# Patient Record
Sex: MALE | Race: WHITE | NOT HISPANIC OR LATINO | Employment: UNEMPLOYED | URBAN - METROPOLITAN AREA
[De-identification: names, ages, dates, MRNs, and addresses within clinical notes are randomized per-mention and may not be internally consistent; named-entity substitution may affect disease eponyms.]

---

## 2022-06-10 ENCOUNTER — LAB REQUISITION (OUTPATIENT)
Dept: LAB | Facility: CLINIC | Age: 67
End: 2022-06-10
Payer: COMMERCIAL

## 2022-06-10 ENCOUNTER — TRANSFERRED RECORDS (OUTPATIENT)
Dept: HEALTH INFORMATION MANAGEMENT | Facility: CLINIC | Age: 67
End: 2022-06-10

## 2022-06-10 DIAGNOSIS — E29.1 TESTICULAR HYPOFUNCTION: ICD-10-CM

## 2022-06-10 LAB
CHOLEST SERPL-MCNC: 220 MG/DL
HDLC SERPL-MCNC: 58 MG/DL
LDLC SERPL CALC-MCNC: 142 MG/DL
PROLACTIN SERPL-MCNC: 85.1 NG/ML (ref 0–15)
TRIGL SERPL-MCNC: 98 MG/DL

## 2022-06-10 PROCEDURE — 84146 ASSAY OF PROLACTIN: CPT | Mod: ORL | Performed by: FAMILY MEDICINE

## 2022-06-10 PROCEDURE — 80061 LIPID PANEL: CPT | Mod: ORL | Performed by: FAMILY MEDICINE

## 2022-06-27 ENCOUNTER — MEDICAL CORRESPONDENCE (OUTPATIENT)
Dept: HEALTH INFORMATION MANAGEMENT | Facility: CLINIC | Age: 67
End: 2022-06-27

## 2022-07-06 ENCOUNTER — TRANSCRIBE ORDERS (OUTPATIENT)
Dept: OTHER | Age: 67
End: 2022-07-06

## 2022-07-06 DIAGNOSIS — D49.7 PITUITARY TUMOR: ICD-10-CM

## 2022-07-06 DIAGNOSIS — R79.89 ELEVATED PROLACTIN LEVEL: Primary | ICD-10-CM

## 2022-07-23 ENCOUNTER — HEALTH MAINTENANCE LETTER (OUTPATIENT)
Age: 67
End: 2022-07-23

## 2022-09-25 ASSESSMENT — ENCOUNTER SYMPTOMS
JAUNDICE: 0
BOWEL INCONTINENCE: 0
RECTAL PAIN: 0
BLOOD IN STOOL: 0
NAUSEA: 0
DYSURIA: 0
DIFFICULTY URINATING: 0
DIARRHEA: 1
FLANK PAIN: 0
HEARTBURN: 0
ABDOMINAL PAIN: 0
CONSTIPATION: 1
BLOATING: 0
HEMATURIA: 0
VOMITING: 0

## 2022-09-26 NOTE — PROGRESS NOTES
Reached out via phone to the pt on 9/26 to remind them of their visit with Dr. Mosqueda.  Irina Lawton                                                                               - Endocrinology Initial Consultation -    Reason for visit/consult:     Elevated prolactin level  Pituitary tumor    Primary care provider: Ashvin Bullock    HPI: A 68 yo male here for the care transition and evaluation of prolactinoma and hypogonadism.  He used to be seen by endocrinology provider at [facility].  Patient was diagnosed hypogonadism in 2001 at that time his testosterone level was 242 and as a part of the work-up found out prolactin was mildly elevated to 48.  He was a started on cabergoline 1 tablet 0.25 mg twice a week he recalls around 2010 he was started and currently still taking with good compliance.  For the testosterone he used to use injection therapy but he did not like the injection and he could not tolerate and he switched to a compound pharmacy of the testosterone product and the testosterone level has been trending.  Moderate fluctuation range between 179-681.  He also has BPH.  He had a 3 times MRI was done 2001 initial MRI undetectable with addition in 2014 3 mm with a hypodense onset area in the sella and, repeated 1 in March 2020 has no change.     Past Medical/Surgical History:  No past medical history on file.  Past Surgical History:   Procedure Laterality Date     INSERT INTRACORONARY STENT      Multiple        Allergies:  Allergies   Allergen Reactions     Gluten Protein [Gluten Meal] Unknown     Hands peel       Current Medications   Current Outpatient Medications   Medication     cabergoline (DOSTINEX) 0.5 MG tablet     Cyanocobalamin (B-12) 1000 MCG TBCR     cyanocobalamin (VITAMIN B-12) 1000 MCG tablet     fish oil-omega-3 fatty acids 500 MG capsule     Glucos-MSM-C-Us-Pccjtd-Xqcnzm (GLUCOSAMINE MSM COMPLEX) TABS tablet     Glucosamine-Chondroitin-MSM (CONDROLITE) 500-200-150 MG TABS     Multiple  Vitamin (MULTIVITAMIN ADULT PO)     selenium 50 MCG TABS tablet     UNABLE TO FIND     UNABLE TO FIND     venlafaxine (EFFEXOR XR) 37.5 MG 24 hr capsule     vitamin C (ASCORBIC ACID) 1000 MG TABS     vitamin D2 (ERGOCALCIFEROL) 71295 units (1250 mcg) capsule     BROMOCRIPTINE MESYLATE PO     No current facility-administered medications for this visit.       Family History:  Family History   Problem Relation Age of Onset     Breast Cancer Mother      Prostate Cancer Father      Breast Cancer Sister        Social History:  Social History     Tobacco Use     Smoking status: Never Smoker     Smokeless tobacco: Never Used   Substance Use Topics     Alcohol use: Yes       ROS:  Full review of systems taken with the help of the intake sheet. Otherwise a complete 14 point review of systems was taken and is negative unless stated in the history above.    Physical Exam:   Vitals: /70 (BP Location: Left arm, Patient Position: Sitting, Cuff Size: Adult Regular)   Pulse 89   Wt 93.9 kg (207 lb)   BMI 29.70 kg/m    BMI= Body mass index is 29.7 kg/m .   General: well appearing, no acute distress, pleasant and conversant,   Mental Status/neuro: alert and oriented  Face: symmetrical, normal facial color  Eyes: anicteric, no proptosis or lid lag  Resp: breathing normally      Labs : I reviewed data from epic and extract and summarize the pertinent data here.      Latest Reference Range & Units 06/10/22 14:54   Prolactin 0.0 - 15.0 ng/mL 85.1 (H)       Lab Results   Component Value Date     09/30/2013      Lab Results   Component Value Date    POTASSIUM 4.3 09/30/2013     Lab Results   Component Value Date    CHLORIDE 101 09/30/2013     Lab Results   Component Value Date    ABEL 8.4 09/30/2013     Lab Results   Component Value Date    CO2 29 09/30/2013     Lab Results   Component Value Date    BUN 15 09/30/2013     Lab Results   Component Value Date    CR 1.05 09/30/2013     Lab Results   Component Value Date      09/30/2013     No results found for: TSH  No results found for: T4  No results found for: A1C    No results found for: IGF1  No results found for: LH  No results found for: FSH  No results found for: ESTROGEN  No results found for: PROLACTIN        MRI Brain: I personally reviewed the original images and agree with the below reports.   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  So far he has been tolerating with this medication and level has been slight supranormal but stable we will continue for the current date medication dose and no change.    -Continue cabergoline 0.5 mg twice a week for now  -Repeating prolactin level in 6 months and in 1 year    Hypogonadism  Currently he is getting testosterone compound from compound pharmacy and he is hoping to continue to get compound pharmacy medication and he is integrated medicine physician will assist for compound medication    -Continue current compounded testosterone product by integrated medicine doctor    -Total testosterone level in 6 months and in 1 year      RTC with me in 1 year    Total 60  minutes spent on the date of the encounter doing chart review, history and exam, reviewing other site medical records, documentation and further activities as noted above.        Cindy Mosqueda MD  Staff Physician  Endocrinology and Metabolism  License: [id number]

## 2022-09-28 ENCOUNTER — OFFICE VISIT (OUTPATIENT)
Dept: ENDOCRINOLOGY | Facility: CLINIC | Age: 67
End: 2022-09-28
Attending: FAMILY MEDICINE
Payer: COMMERCIAL

## 2022-09-28 VITALS
BODY MASS INDEX: 29.7 KG/M2 | WEIGHT: 207 LBS | SYSTOLIC BLOOD PRESSURE: 121 MMHG | DIASTOLIC BLOOD PRESSURE: 70 MMHG | HEART RATE: 89 BPM

## 2022-09-28 DIAGNOSIS — D49.7 PITUITARY TUMOR: ICD-10-CM

## 2022-09-28 DIAGNOSIS — E29.1 HYPOGONADISM MALE: ICD-10-CM

## 2022-09-28 DIAGNOSIS — D35.2 PROLACTINOMA (H): Primary | ICD-10-CM

## 2022-09-28 PROCEDURE — 99205 OFFICE O/P NEW HI 60 MIN: CPT | Performed by: INTERNAL MEDICINE

## 2022-09-28 RX ORDER — ERGOCALCIFEROL 1.25 MG/1
50000 CAPSULE, LIQUID FILLED ORAL WEEKLY
COMMUNITY

## 2022-09-28 RX ORDER — CABERGOLINE 0.5 MG/1
0.05 TABLET ORAL CONTINUOUS
COMMUNITY
Start: 2022-06-29 | End: 2024-06-26

## 2022-09-28 RX ORDER — LANOLIN ALCOHOL/MO/W.PET/CERES
1000 CREAM (GRAM) TOPICAL DAILY
COMMUNITY

## 2022-09-28 RX ORDER — OMEGA-3/DHA/EPA/FISH OIL 60 MG-90MG
CAPSULE ORAL
COMMUNITY

## 2022-09-28 RX ORDER — VENLAFAXINE HYDROCHLORIDE 37.5 MG/1
37.5 CAPSULE, EXTENDED RELEASE ORAL 2 TIMES DAILY
COMMUNITY
Start: 2022-09-02

## 2022-09-28 RX ORDER — MULTIVIT WITH MINERALS/LUTEIN
1000 TABLET ORAL DAILY
COMMUNITY

## 2022-09-28 RX ORDER — GLUCOSAMINE/CHONDROITIN A/MSM 500-200 MG
TABLET ORAL
COMMUNITY

## 2022-09-28 ASSESSMENT — PAIN SCALES - GENERAL: PAINLEVEL: NO PAIN (0)

## 2022-09-28 NOTE — NURSING NOTE
"Chief Complaint   Patient presents with     New Patient     Vital signs:      BP: 121/70 Pulse: 89             Weight: 93.9 kg (207 lb)  Estimated body mass index is 29.7 kg/m  as calculated from the following:    Height as of 8/20/14: 1.778 m (5' 10\").    Weight as of this encounter: 93.9 kg (207 lb).          "

## 2022-10-01 ENCOUNTER — HEALTH MAINTENANCE LETTER (OUTPATIENT)
Age: 67
End: 2022-10-01

## 2023-08-06 ENCOUNTER — HEALTH MAINTENANCE LETTER (OUTPATIENT)
Age: 68
End: 2023-08-06

## 2024-06-26 ENCOUNTER — OFFICE VISIT (OUTPATIENT)
Dept: ENDOCRINOLOGY | Facility: CLINIC | Age: 69
End: 2024-06-26
Payer: COMMERCIAL

## 2024-06-26 VITALS
OXYGEN SATURATION: 96 % | DIASTOLIC BLOOD PRESSURE: 89 MMHG | SYSTOLIC BLOOD PRESSURE: 133 MMHG | HEART RATE: 72 BPM | WEIGHT: 215 LBS | BODY MASS INDEX: 30.85 KG/M2

## 2024-06-26 DIAGNOSIS — E29.1 HYPOGONADISM MALE: ICD-10-CM

## 2024-06-26 DIAGNOSIS — D35.2 PROLACTINOMA (H): Primary | ICD-10-CM

## 2024-06-26 PROCEDURE — 99214 OFFICE O/P EST MOD 30 MIN: CPT | Performed by: INTERNAL MEDICINE

## 2024-06-26 PROCEDURE — G2211 COMPLEX E/M VISIT ADD ON: HCPCS | Performed by: INTERNAL MEDICINE

## 2024-06-26 RX ORDER — CABERGOLINE 0.5 MG/1
0.05 TABLET ORAL DAILY
Status: CANCELLED | OUTPATIENT
Start: 2024-06-26

## 2024-06-26 RX ORDER — CABERGOLINE 0.5 MG/1
0.5 TABLET ORAL
Qty: 24 TABLET | Refills: 3 | Status: SHIPPED | OUTPATIENT
Start: 2024-06-27 | End: 2024-06-28

## 2024-06-26 RX ORDER — LEVOTHYROXINE SODIUM 75 UG/1
75 TABLET ORAL DAILY
Qty: 90 TABLET | Refills: 3 | Status: SHIPPED | OUTPATIENT
Start: 2024-06-26

## 2024-06-26 RX ORDER — TESTOSTERONE 1.62 MG/G
1 GEL TRANSDERMAL DAILY
Qty: 75 G | Refills: 3 | Status: SHIPPED | OUTPATIENT
Start: 2024-06-26

## 2024-06-26 ASSESSMENT — PAIN SCALES - GENERAL: PAINLEVEL: NO PAIN (0)

## 2024-06-26 NOTE — LETTER
6/26/2024       RE: Dylan Bob  [address] [facility] [address]     Dear Colleague,    Thank you for referring your patient, Dylan Bob, to the [facility]. Please see a copy of my visit note below.                                                                               - Endocrinology Follow up -    Reason for visit/consult:     Elevated prolactin level  Pituitary tumor    Primary care provider: Ashvin Bullock    Assessment and Plan  A 69 year old male with microprolactinoma and mild hypogonadism    Micro-prolactinoma  Lesion has been stable 3 mm, prolactin level has been mildly elevated most recent 1 in June 2022 showed 85.1 with cabergoline 0.5 mg twice a week over 10 years, most recent lab in 2/2024 at Dr. Johnson's office was 143, increased to 3 tab weekly, however patient is not prefer 3 tab a week and reviewing lab, this is relatively stable condition, therefore I will put back to his regular dose 2 tab weekly    -cabergoline 0.5 mg twice a week   -Repeating prolactin level in 6 months and in 1 year    Hypogonadism  Total tetosterone level has been low, he used to use gel 10 years ago    - start testosterone gel 1 pump daily    -Total testosterone level in 6 months and in 1 year    Elevated TSH   Mild hypothyroidism and worsening fatigue    - start LT4 75 mcg daily      RTC with me in 1 year    The longitudinal plan of care for the diagnosis(es)/condition(s) as documented were addressed during this visit. Due to the added complexity in care, I will continue to support Marvin in the subsequent management and with ongoing continuity of care.     Total 35 minutes spent on the date of the encounter doing chart review, history and exam, reviewing other site medical records, documentation and further activities as noted above.      Cindy Mosqueda MD  Staff Physician  Endocrinology and  Metabolism  License: [id number]      Interval History as of 6/26/2024 : Patient has been doing well.  Medication compliance  excellent . New event includes : recently feeling more fatigued and less active in his life.   HPI: A 66 yo male here for the care transition and evaluation of prolactinoma and hypogonadism.  He used to be seen by endocrinology provider at [facility].  Patient was diagnosed hypogonadism in 2001 at that time his testosterone level was 242 and as a part of the work-up found out prolactin was mildly elevated to 48.  He was a started on cabergoline 1 tablet 0.25 mg twice a week he recalls around 2010 he was started and currently still taking with good compliance.  For the testosterone he used to use injection therapy but he did not like the injection and he could not tolerate and he switched to a compound pharmacy of the testosterone product and the testosterone level has been trending.  Moderate fluctuation range between 179-681.  He also has BPH.  He had a 3 times MRI was done 2001 initial MRI undetectable with addition in 2014 3 mm with a hypodense onset area in the sella and, repeated 1 in March 2020 has no change.     Past Medical/Surgical History:  No past medical history on file.  Past Surgical History:   Procedure Laterality Date    INSERT INTRACORONARY STENT      Multiple        Allergies:  Allergies   Allergen Reactions    Gluten Protein [Gluten Meal] Unknown     Hands peel       Current Medications   Current Outpatient Medications   Medication Sig Dispense Refill    cabergoline (DOSTINEX) 0.5 MG tablet Take 0.05 mg by mouth continuously Takes 3 each week = 1.5MG      Cyanocobalamin (B-12) 1000 MCG TBCR       cyanocobalamin (VITAMIN B-12) 1000 MCG tablet Take 1,000 mcg by mouth daily      fish oil-omega-3 fatty acids 500 MG capsule       Glucos-MSM-C-Ts-Rwknqy-Gspeyp (GLUCOSAMINE MSM COMPLEX) TABS tablet Take 1 tablet by mouth daily 500mg      Glucosamine-Chondroitin-MSM (CONDROLITE)  500-200-150 MG TABS       Multiple Vitamin (MULTIVITAMIN ADULT PO)       selenium 50 MCG TABS tablet Take 200 mcg by mouth daily      UNABLE TO FIND 320 mg daily MEDICATION NAME: ProstAvan (Oligo)      UNABLE TO FIND daily MEDICATION NAME: DopaPuls (pure Encapsulations)      venlafaxine (EFFEXOR XR) 37.5 MG 24 hr capsule Take 37.5 mg by mouth 2 times daily      vitamin C (ASCORBIC ACID) 1000 MG TABS Take 1,000 mg by mouth daily      vitamin D2 (ERGOCALCIFEROL) 46191 units (1250 mcg) capsule Take 50,000 Units by mouth once a week      BROMOCRIPTINE MESYLATE PO        No current facility-administered medications for this visit.       Family History:  Family History   Problem Relation Age of Onset    Breast Cancer Mother     Prostate Cancer Father     Breast Cancer Sister        Social History:  Social History     Tobacco Use    Smoking status: Never    Smokeless tobacco: Never   Substance Use Topics    Alcohol use: Yes       ROS:  Full review of systems taken with the help of the intake sheet. Otherwise a complete 14 point review of systems was taken and is negative unless stated in the history above.    Physical Exam:   Vitals: /89   Pulse 72   Wt 97.5 kg (215 lb)   SpO2 96%   BMI 30.85 kg/m    BMI= Body mass index is 30.85 kg/m .   General: well appearing, no acute distress, pleasant and conversant,   Mental Status/neuro: alert and oriented  Face: symmetrical, normal facial color  Eyes: anicteric, no proptosis or lid lag  Resp: breathing normally      Labs : I reviewed data from epic and extract and summarize the pertinent data here.          Latest Reference Range & Units 06/10/22 14:54   Prolactin 0.0 - 15.0 ng/mL 85.1 (H)         MRI Brain: I personally reviewed the original images and agree with the below reports.   3 mm hypoenhanced sellar lesion

## 2024-06-26 NOTE — PROGRESS NOTES
- Endocrinology Follow up -    Reason for visit/consult:     Elevated prolactin level  Pituitary tumor    Primary care provider: Ashvin Bullock    Assessment and Plan  A 69 year old male with microprolactinoma and mild hypogonadism    Micro-prolactinoma  Lesion has been stable 3 mm, prolactin level has been mildly elevated most recent 1 in June 2022 showed 85.1 with cabergoline 0.5 mg twice a week over 10 years, most recent lab in 2/2024 at Dr. Johnson's office was 143, increased to 3 tab weekly, however patient is not prefer 3 tab a week and reviewing lab, this is relatively stable condition, therefore I will put back to his regular dose 2 tab weekly    -cabergoline 0.5 mg twice a week   -Repeating prolactin level in 6 months and in 1 year    Hypogonadism  Total tetosterone level has been low, he used to use gel 10 years ago    - start testosterone gel 1 pump daily    -Total testosterone level in 6 months and in 1 year    Elevated TSH   Mild hypothyroidism and worsening fatigue    - start LT4 75 mcg daily      RTC with me in 1 year    The longitudinal plan of care for the diagnosis(es)/condition(s) as documented were addressed during this visit. Due to the added complexity in care, I will continue to support Marvin in the subsequent management and with ongoing continuity of care.     Total 35 minutes spent on the date of the encounter doing chart review, history and exam, reviewing other site medical records, documentation and further activities as noted above.      Cindy Mosqueda MD  Staff Physician  Endocrinology and Metabolism  License: [id number]      Interval History as of 6/26/2024 : Patient has been doing well.  Medication compliance  excellent . New event includes : recently feeling more fatigued and less active in his life.   HPI: A 66 yo male here for the care transition and evaluation of prolactinoma and hypogonadism.  He used to  be seen by endocrinology provider at [facility].  Patient was diagnosed hypogonadism in 2001 at that time his testosterone level was 242 and as a part of the work-up found out prolactin was mildly elevated to 48.  He was a started on cabergoline 1 tablet 0.25 mg twice a week he recalls around 2010 he was started and currently still taking with good compliance.  For the testosterone he used to use injection therapy but he did not like the injection and he could not tolerate and he switched to a compound pharmacy of the testosterone product and the testosterone level has been trending.  Moderate fluctuation range between 179-681.  He also has BPH.  He had a 3 times MRI was done 2001 initial MRI undetectable with addition in 2014 3 mm with a hypodense onset area in the sella and, repeated 1 in March 2020 has no change.     Past Medical/Surgical History:  No past medical history on file.  Past Surgical History:   Procedure Laterality Date    INSERT INTRACORONARY STENT      Multiple        Allergies:  Allergies   Allergen Reactions    Gluten Protein [Gluten Meal] Unknown     Hands peel       Current Medications   Current Outpatient Medications   Medication Sig Dispense Refill    cabergoline (DOSTINEX) 0.5 MG tablet Take 0.05 mg by mouth continuously Takes 3 each week = 1.5MG      Cyanocobalamin (B-12) 1000 MCG TBCR       cyanocobalamin (VITAMIN B-12) 1000 MCG tablet Take 1,000 mcg by mouth daily      fish oil-omega-3 fatty acids 500 MG capsule       Glucos-MSM-C-Va-Gfdyeo-Owfprb (GLUCOSAMINE MSM COMPLEX) TABS tablet Take 1 tablet by mouth daily 500mg      Glucosamine-Chondroitin-MSM (CONDROLITE) 500-200-150 MG TABS       Multiple Vitamin (MULTIVITAMIN ADULT PO)       selenium 50 MCG TABS tablet Take 200 mcg by mouth daily      UNABLE TO FIND 320 mg daily MEDICATION NAME: ProstAvan (Oligo)      UNABLE TO FIND daily MEDICATION NAME: DopaPuls (pure Encapsulations)      venlafaxine (EFFEXOR XR) 37.5 MG 24 hr capsule Take  37.5 mg by mouth 2 times daily      vitamin C (ASCORBIC ACID) 1000 MG TABS Take 1,000 mg by mouth daily      vitamin D2 (ERGOCALCIFEROL) 15642 units (1250 mcg) capsule Take 50,000 Units by mouth once a week      BROMOCRIPTINE MESYLATE PO        No current facility-administered medications for this visit.       Family History:  Family History   Problem Relation Age of Onset    Breast Cancer Mother     Prostate Cancer Father     Breast Cancer Sister        Social History:  Social History     Tobacco Use    Smoking status: Never    Smokeless tobacco: Never   Substance Use Topics    Alcohol use: Yes       ROS:  Full review of systems taken with the help of the intake sheet. Otherwise a complete 14 point review of systems was taken and is negative unless stated in the history above.    Physical Exam:   Vitals: /89   Pulse 72   Wt 97.5 kg (215 lb)   SpO2 96%   BMI 30.85 kg/m    BMI= Body mass index is 30.85 kg/m .   General: well appearing, no acute distress, pleasant and conversant,   Mental Status/neuro: alert and oriented  Face: symmetrical, normal facial color  Eyes: anicteric, no proptosis or lid lag  Resp: breathing normally      Labs : I reviewed data from epic and extract and summarize the pertinent data here.          Latest Reference Range & Units 06/10/22 14:54   Prolactin 0.0 - 15.0 ng/mL 85.1 (H)         MRI Brain: I personally reviewed the original images and agree with the below reports.   3 mm hypoenhanced sellar lesion

## 2024-06-26 NOTE — NURSING NOTE
"Chief Complaint   Patient presents with    Pituitary Problem     Vital signs:      BP: 133/89 Pulse: 72     SpO2: 96 %       Weight: 97.5 kg (215 lb)  Estimated body mass index is 30.85 kg/m  as calculated from the following:    Height as of 8/20/14: 1.778 m (5' 10\").    Weight as of this encounter: 97.5 kg (215 lb).        "

## 2024-06-28 ENCOUNTER — TELEPHONE (OUTPATIENT)
Dept: ENDOCRINOLOGY | Facility: CLINIC | Age: 69
End: 2024-06-28
Payer: COMMERCIAL

## 2024-06-28 DIAGNOSIS — D35.2 PROLACTINOMA (H): ICD-10-CM

## 2024-06-28 RX ORDER — CABERGOLINE 0.5 MG/1
TABLET ORAL
Qty: 24 TABLET | Refills: 3 | Status: SHIPPED | OUTPATIENT
Start: 2024-07-01

## 2024-06-28 NOTE — TELEPHONE ENCOUNTER
Sent new orders  dc'ing the old order for 1 twice weekly as current order Tricia Mann RN on 6/28/2024 at 6:21 PM

## 2024-06-28 NOTE — TELEPHONE ENCOUNTER
[facility] Call Center    Phone Message    May a detailed message be left on voicemail: yes     Reason for Call: Medication Question or concern regarding medication   Prescription Clarification  Name of Medication: cabergoline (DOSTINEX) 0.5 MG tablet [9616  Prescribing Provider: Cindy Mosqueda MD   Pharmacy: [id number] [address]   What on the order needs clarification? Pharmacy is requesting a clarification of instructions      Action Taken: Other: endo    Travel Screening: Not Applicable     Date of Service:

## 2024-07-22 ENCOUNTER — TELEPHONE (OUTPATIENT)
Dept: ENDOCRINOLOGY | Facility: CLINIC | Age: 69
End: 2024-07-22
Payer: COMMERCIAL

## 2024-07-22 NOTE — TELEPHONE ENCOUNTER
Patient confirmed scheduled appointment:  Date: 6/25/25   Time: 10 am   Visit type: return pituitary   Provider: Jaylin   Location[place] [facility]  Testing/imaging: NA   Additional notes: Spoke to pt and scheduled per below message. Also canceled appt in September due to only needing a 1 year follow-up     Disposition: Return in about 1 year (around 6/26/2025).     Milvia Cisneros on 7/22/2024 at 3:02 PM

## 2024-09-29 ENCOUNTER — HEALTH MAINTENANCE LETTER (OUTPATIENT)
Age: 69
End: 2024-09-29

## 2025-04-16 ENCOUNTER — LAB (OUTPATIENT)
Dept: LAB | Facility: CLINIC | Age: 70
End: 2025-04-16
Payer: COMMERCIAL

## 2025-04-16 DIAGNOSIS — D35.2 PROLACTINOMA (H): ICD-10-CM

## 2025-04-16 DIAGNOSIS — E29.1 HYPOGONADISM MALE: ICD-10-CM

## 2025-04-16 LAB
PROLACTIN SERPL 3RD IS-MCNC: 134 NG/ML (ref 4–15)
T4 FREE SERPL-MCNC: 1.47 NG/DL (ref 0.9–1.7)
TSH SERPL DL<=0.005 MIU/L-ACNC: 0.06 UIU/ML (ref 0.3–4.2)

## 2025-04-16 PROCEDURE — 84146 ASSAY OF PROLACTIN: CPT

## 2025-04-16 PROCEDURE — 84439 ASSAY OF FREE THYROXINE: CPT

## 2025-04-16 PROCEDURE — 36415 COLL VENOUS BLD VENIPUNCTURE: CPT

## 2025-04-16 PROCEDURE — 84443 ASSAY THYROID STIM HORMONE: CPT

## 2025-04-19 LAB — TESTOST SERPL-MCNC: 124 NG/DL (ref 240–950)

## 2025-05-18 ENCOUNTER — MYC REFILL (OUTPATIENT)
Dept: ENDOCRINOLOGY | Facility: CLINIC | Age: 70
End: 2025-05-18
Payer: COMMERCIAL

## 2025-05-18 DIAGNOSIS — D35.2 PROLACTINOMA (H): ICD-10-CM

## 2025-05-19 RX ORDER — CABERGOLINE 0.5 MG/1
TABLET ORAL
Qty: 24 TABLET | Refills: 0 | Status: SHIPPED | OUTPATIENT
Start: 2025-05-19

## 2025-06-25 ENCOUNTER — OFFICE VISIT (OUTPATIENT)
Dept: ENDOCRINOLOGY | Facility: CLINIC | Age: 70
End: 2025-06-25
Payer: COMMERCIAL

## 2025-06-25 VITALS
OXYGEN SATURATION: 96 % | SYSTOLIC BLOOD PRESSURE: 123 MMHG | WEIGHT: 210 LBS | HEART RATE: 70 BPM | DIASTOLIC BLOOD PRESSURE: 82 MMHG

## 2025-06-25 DIAGNOSIS — D35.2 PROLACTINOMA (H): ICD-10-CM

## 2025-06-25 DIAGNOSIS — E29.1 HYPOGONADISM MALE: ICD-10-CM

## 2025-06-25 PROCEDURE — 3074F SYST BP LT 130 MM HG: CPT | Performed by: INTERNAL MEDICINE

## 2025-06-25 PROCEDURE — 99214 OFFICE O/P EST MOD 30 MIN: CPT | Performed by: INTERNAL MEDICINE

## 2025-06-25 PROCEDURE — G2211 COMPLEX E/M VISIT ADD ON: HCPCS | Performed by: INTERNAL MEDICINE

## 2025-06-25 PROCEDURE — 1126F AMNT PAIN NOTED NONE PRSNT: CPT | Performed by: INTERNAL MEDICINE

## 2025-06-25 PROCEDURE — 3079F DIAST BP 80-89 MM HG: CPT | Performed by: INTERNAL MEDICINE

## 2025-06-25 RX ORDER — LEVOTHYROXINE SODIUM 75 UG/1
75 TABLET ORAL DAILY
Qty: 90 TABLET | Refills: 3 | Status: SHIPPED | OUTPATIENT
Start: 2025-06-25

## 2025-06-25 RX ORDER — CABERGOLINE 0.5 MG/1
TABLET ORAL
Qty: 24 TABLET | Refills: 3 | Status: SHIPPED | OUTPATIENT
Start: 2025-06-25

## 2025-06-25 ASSESSMENT — PAIN SCALES - GENERAL: PAINLEVEL_OUTOF10: NO PAIN (0)

## 2025-06-25 NOTE — NURSING NOTE
"Chief Complaint   Patient presents with    Pituitary Problem    Thyroid Problem      PATIENT HEALTH QUESTIONNAIRE-9 (PHQ - 9)    Over the last 2 weeks, how often have you been bothered by any of the following problems?    1. Little interest or pleasure in doing things -  Several days   2. Feeling down, depressed, or hopeless -  Several days   3. Trouble falling or staying asleep, or sleeping too much - Not at all   4. Feeling tired or having little energy -  Not at all   5. Poor appetite or overeating -  Not at all   6. Feeling bad about yourself - or that you are a failure or have let yourself or your family down -      7. Trouble concentrating on things, such as reading the newspaper or watching television - Not at all   8. Moving or speaking so slowly that other people could have noticed? Or the opposite - being so fidgety or restless that you have been moving around a lot more than usual Not at all   9. Thoughts that you would be better off dead or of hurting  yourself in some way Not at all   Total Score:       If you checked off any problems, how difficult have these problems made it for you to do your work, take care of things at home, or get along with other people?      Developed by Oswaldo Castillo, Mariann Stern, Ariel Chen and colleagues, with an educational greer from [employer] No permission required to reproduce, translate, display or distribute. permission required to reproduce, translate, display or distribute.    Vital signs:      BP: 123/82 Pulse: 70     SpO2: 96 %       Weight: 95.3 kg (210 lb)  Estimated body mass index is 30.85 kg/m  as calculated from the following:    Height as of 8/20/14: 1.778 m (5' 10\").    Weight as of 6/26/24: 97.5 kg (215 lb).        "

## 2025-06-25 NOTE — PROGRESS NOTES
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 Johnson's office was 143, increased to 3 tab weekly, however patient did not prefer 3 tab a week and reviewing lab, this is relatively stable condition, therefore we put him back on his regular dose 2 tab weekly. His most recent PRL 4/16/25 was stable at 134. We discussed that because his condition is stable on 2 tab weekly and he had side effects on 3 tablets we will continue this dose.    -continue cabergoline 0.5 mg twice a week   -Repeating prolactin level in 6 months and in 1 year    Hypogonadism  Total tetosterone level has been low most recent 4/16/25 was 124. He used to use gel 10 years ago. He has also tried injections and an aromatase inhibitor in the past. He had side effects every time he went on testosterone. He experiences agitation and reports he is argumentative with his wife. We discussed the importance of trying to get his levels up.    - start testosterone gel 1 pump daily  -Total testosterone level in 6 months and in 1 year    Hypothyroidism  - continue LT4 75 mcg daily    RTC with me in 1 year    Patient was seen and plan of care discussed with Dr. Mosqueda.      Jessie Lainez, MS3      Attending tie-in note   I saw the patient with Jessie Lainez MS3  and directly examined patient and discussed. Agree above note and plan.    The longitudinal plan of care for the diagnosis(es)/condition(s) as documented were addressed during this visit. Due to the added complexity in care, I will  continue to support Marvin in the subsequent management and with ongoing continuity of care.     Total 35 minutes spent on the date of the encounter doing chart review, history and exam, reviewing other site medical records, documentation and further activities as noted above.      Cindy Mosqueda MD  Staff Physician  Endocrinology and Metabolism  License: [id number]      Interval History as of 6/25/2025 : Patient has been doing well. Compliance to cabergoline and LT4 excellent.   Interval History as of 6/26/2024 : Patient has been doing well.  Medication compliance  excellent . New event includes : recently feeling more fatigued and less active in his life.   HPI: A 68 yo male here for the care transition and evaluation of prolactinoma and hypogonadism.  He used to be seen by endocrinology provider at [facility].  Patient was diagnosed hypogonadism in 2001 at that time his testosterone level was 242 and as a part of the work-up found out prolactin was mildly elevated to 48.  He was a started on cabergoline 1 tablet 0.25 mg twice a week he recalls around 2010 he was started and currently still taking with good compliance.  For the testosterone he used to use injection therapy but he did not like the injection and he could not tolerate and he switched to a compound pharmacy of the testosterone product and the testosterone level has been trending.  Moderate fluctuation range between 179-681.  He also has BPH.  He had a 3 times MRI was done 2001 initial MRI undetectable with addition in 2014 3 mm with a hypodense onset area in the sella and, repeated 1 in March 2020 has no change.     Past Medical/Surgical History:  No past medical history on file.  Past Surgical History:   Procedure Laterality Date    INSERT INTRACORONARY STENT      Multiple        Allergies:  Allergies   Allergen Reactions    Gluten Protein [Gluten Meal] Unknown     Hands peel       Current Medications   Current Outpatient Medications   Medication Sig  Dispense Refill    cabergoline (DOSTINEX) 0.5 MG tablet Take 1 tablet  twice weekly by mouth 24 tablet 0    cyanocobalamin (VITAMIN B-12) 1000 MCG tablet Take 1,000 mcg by mouth daily      fish oil-omega-3 fatty acids 500 MG capsule       Glucos-MSM-C-Qj-Zcjyvc-Kjxsua (GLUCOSAMINE MSM COMPLEX) TABS tablet Take 1 tablet by mouth daily 500mg      Glucosamine-Chondroitin-MSM (CONDROLITE) 500-200-150 MG TABS       levothyroxine (SYNTHROID/LEVOTHROID) 75 MCG tablet Take 1 tablet (75 mcg) by mouth daily 90 tablet 3    Multiple Vitamin (MULTIVITAMIN ADULT PO)       selenium 50 MCG TABS tablet Take 200 mcg by mouth daily      UNABLE TO FIND 320 mg daily MEDICATION NAME: ProstAvan (Oligo)      UNABLE TO FIND daily MEDICATION NAME: DopaPuls (pure Encapsulations)      venlafaxine (EFFEXOR XR) 37.5 MG 24 hr capsule Take 37.5 mg by mouth 2 times daily      vitamin C (ASCORBIC ACID) 1000 MG TABS Take 1,000 mg by mouth daily      vitamin D2 (ERGOCALCIFEROL) 13096 units (1250 mcg) capsule Take 50,000 Units by mouth once a week       No current facility-administered medications for this visit.       Family History:  Family History   Problem Relation Age of Onset    Breast Cancer Mother     Prostate Cancer Father     Breast Cancer Sister        Social History:  Social History     Tobacco Use    Smoking status: Never    Smokeless tobacco: Never   Substance Use Topics    Alcohol use: Yes       ROS:  Full review of systems taken with the help of the intake sheet. Otherwise a complete 14 point review of systems was taken and is negative unless stated in the history above.    Physical Exam:   Vitals: /82   Pulse 70   Wt 95.3 kg (210 lb)   SpO2 96%   BMI= There is no height or weight on file to calculate BMI.   General: well appearing, no acute distress, pleasant and conversant,   Mental Status/neuro: alert and oriented  Face: symmetrical, normal facial color  Eyes: anicteric, no proptosis or lid lag  Resp: breathing  normally      Labs : I reviewed data from epic and extract and summarize the pertinent data here.          Latest Reference Range & Units 06/10/22 14:54   Prolactin 0.0 - 15.0 ng/mL 85.1 (H)         MRI Brain: I personally reviewed the original images and agree with the below reports.   3 mm hypoenhanced sellar lesion

## 2025-06-25 NOTE — LETTER
6/25/2025       RE: Dylan Bob  [address] [facility] [address]     Dear Colleague,    Thank you for referring your patient, Dylan Bob, to the [facility] [place] [facility]. Please see a copy of my visit note below.                                                                               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 Johnson's office was 143, increased to 3 tab weekly, however patient did not prefer 3 tab a week and reviewing lab, this is relatively stable condition, therefore we put him back on his regular dose 2 tab weekly. His most recent PRL 4/16/25 was stable at 134. We discussed that because his condition is stable on 2 tab weekly and he had side effects on 3 tablets we will continue this dose.    -continue cabergoline 0.5 mg twice a week   -Repeating prolactin level in 6 months and in 1 year    Hypogonadism  Total tetosterone level has been low most recent 4/16/25 was 124. He used to use gel 10 years ago. He has also tried injections and an aromatase inhibitor in the past. He had side effects every time he went on testosterone. He experiences agitation and reports he is argumentative with his wife. We discussed the importance of trying to get his levels up.    - start testosterone gel 1 pump daily  -Total testosterone level in 6 months and in 1 year    Hypothyroidism  - continue LT4 75 mcg daily    RTC with me in 1 year    Patient was seen and plan of care discussed with Dr. Mosqueda.      Jessie  Migel, MS3      Attending tie-in note   I saw the patient with Jessie Lainez MS3  and directly examined patient and discussed. Agree above note and plan.    The longitudinal plan of care for the diagnosis(es)/condition(s) as documented were addressed during this visit. Due to the added complexity in care, I will continue to support Marvin in the subsequent management and with ongoing continuity of care.     Total 35 minutes spent on the date of the encounter doing chart review, history and exam, reviewing other site medical records, documentation and further activities as noted above.      Cindy Mosqueda MD  Staff Physician  Endocrinology and Metabolism  License: [id number]      Interval History as of 6/25/2025 : Patient has been doing well. Compliance to cabergoline and LT4 excellent.   Interval History as of 6/26/2024 : Patient has been doing well.  Medication compliance  excellent . New event includes : recently feeling more fatigued and less active in his life.   HPI: A 66 yo male here for the care transition and evaluation of prolactinoma and hypogonadism.  He used to be seen by endocrinology provider at [facility].  Patient was diagnosed hypogonadism in 2001 at that time his testosterone level was 242 and as a part of the work-up found out prolactin was mildly elevated to 48.  He was a started on cabergoline 1 tablet 0.25 mg twice a week he recalls around 2010 he was started and currently still taking with good compliance.  For the testosterone he used to use injection therapy but he did not like the injection and he could not tolerate and he switched to a compound pharmacy of the testosterone product and the testosterone level has been trending.  Moderate fluctuation range between 179-681.  He also has BPH.  He had a 3 times MRI was done 2001 initial MRI undetectable with addition in 2014 3 mm with a hypodense onset area in the sella and, repeated 1 in March 2020 has no change.     Past Medical/Surgical  History:  No past medical history on file.  Past Surgical History:   Procedure Laterality Date     INSERT INTRACORONARY STENT      Multiple        Allergies:  Allergies   Allergen Reactions     Gluten Protein [Gluten Meal] Unknown     Hands peel       Current Medications   Current Outpatient Medications   Medication Sig Dispense Refill     cabergoline (DOSTINEX) 0.5 MG tablet Take 1 tablet  twice weekly by mouth 24 tablet 0     cyanocobalamin (VITAMIN B-12) 1000 MCG tablet Take 1,000 mcg by mouth daily       fish oil-omega-3 fatty acids 500 MG capsule        Glucos-MSM-C-Sc-Jnuudt-Kjcmbw (GLUCOSAMINE MSM COMPLEX) TABS tablet Take 1 tablet by mouth daily 500mg       Glucosamine-Chondroitin-MSM (CONDROLITE) 500-200-150 MG TABS        levothyroxine (SYNTHROID/LEVOTHROID) 75 MCG tablet Take 1 tablet (75 mcg) by mouth daily 90 tablet 3     Multiple Vitamin (MULTIVITAMIN ADULT PO)        selenium 50 MCG TABS tablet Take 200 mcg by mouth daily       UNABLE TO FIND 320 mg daily MEDICATION NAME: ProstAvan (Oligo)       UNABLE TO FIND daily MEDICATION NAME: DopaPuls (pure Encapsulations)       venlafaxine (EFFEXOR XR) 37.5 MG 24 hr capsule Take 37.5 mg by mouth 2 times daily       vitamin C (ASCORBIC ACID) 1000 MG TABS Take 1,000 mg by mouth daily       vitamin D2 (ERGOCALCIFEROL) 32267 units (1250 mcg) capsule Take 50,000 Units by mouth once a week       No current facility-administered medications for this visit.       Family History:  Family History   Problem Relation Age of Onset     Breast Cancer Mother      Prostate Cancer Father      Breast Cancer Sister        Social History:  Social History     Tobacco Use     Smoking status: Never     Smokeless tobacco: Never   Substance Use Topics     Alcohol use: Yes       ROS:  Full review of systems taken with the help of the intake sheet. Otherwise a complete 14 point review of systems was taken and is negative unless stated in the history above.    Physical Exam:   Vitals: BP  123/82   Pulse 70   Wt 95.3 kg (210 lb)   SpO2 96%   BMI= There is no height or weight on file to calculate BMI.   General: well appearing, no acute distress, pleasant and conversant,   Mental Status/neuro: alert and oriented  Face: symmetrical, normal facial color  Eyes: anicteric, no proptosis or lid lag  Resp: breathing normally      Labs : I reviewed data from epic and extract and summarize the pertinent data here.          Latest Reference Range & Units 06/10/22 14:54   Prolactin 0.0 - 15.0 ng/mL 85.1 (H)         MRI Brain: I personally reviewed the original images and agree with the below reports.   3 mm hypoenhanced sellar lesion        Again, thank you for allowing me to participate in the care of your patient.      Sincerely,    Cindy Mosqueda MD